# Patient Record
Sex: MALE | Race: WHITE | NOT HISPANIC OR LATINO | Employment: FULL TIME | URBAN - METROPOLITAN AREA
[De-identification: names, ages, dates, MRNs, and addresses within clinical notes are randomized per-mention and may not be internally consistent; named-entity substitution may affect disease eponyms.]

---

## 2020-04-23 ENCOUNTER — OCCUPATIONAL HEALTH (OUTPATIENT)
Dept: URGENT CARE | Facility: CLINIC | Age: 45
End: 2020-04-23

## 2020-04-23 DIAGNOSIS — Z02.89 ENCOUNTER FOR PHYSICAL EXAMINATION RELATED TO EMPLOYMENT: ICD-10-CM

## 2020-04-23 PROCEDURE — 99499 UNLISTED E&M SERVICE: CPT | Mod: S$GLB,,, | Performed by: EMERGENCY MEDICINE

## 2020-04-23 PROCEDURE — 99499 PHYSICAL - BASIC COMPLEXITY: ICD-10-PCS | Mod: S$GLB,,, | Performed by: EMERGENCY MEDICINE

## 2023-04-20 PROBLEM — Z00.00 ANNUAL PHYSICAL EXAM: Status: ACTIVE | Noted: 2023-04-20

## 2023-05-15 PROBLEM — I10 MODERATE HYPERTENSION: Status: ACTIVE | Noted: 2023-05-15

## 2023-05-18 ENCOUNTER — TELEPHONE (OUTPATIENT)
Dept: ENDOCRINOLOGY | Facility: HOSPITAL | Age: 48
End: 2023-05-18
Payer: COMMERCIAL

## 2023-05-22 NOTE — TELEPHONE ENCOUNTER
Please schedule visit with me on June 7th in the afternoon. Please open up my afternoon schedule that day.

## 2023-06-07 ENCOUNTER — OFFICE VISIT (OUTPATIENT)
Dept: ENDOCRINOLOGY | Facility: CLINIC | Age: 48
End: 2023-06-07
Payer: COMMERCIAL

## 2023-06-07 DIAGNOSIS — E66.01 CLASS 2 SEVERE OBESITY DUE TO EXCESS CALORIES WITH SERIOUS COMORBIDITY AND BODY MASS INDEX (BMI) OF 38.0 TO 38.9 IN ADULT: ICD-10-CM

## 2023-06-07 DIAGNOSIS — E88.810 METABOLIC SYNDROME: ICD-10-CM

## 2023-06-07 DIAGNOSIS — R74.8 ELEVATED LIVER ENZYMES: ICD-10-CM

## 2023-06-07 DIAGNOSIS — E11.9 TYPE 2 DIABETES MELLITUS WITHOUT COMPLICATION, WITHOUT LONG-TERM CURRENT USE OF INSULIN: Primary | ICD-10-CM

## 2023-06-07 DIAGNOSIS — E11.65 TYPE 2 DIABETES MELLITUS WITH HYPERGLYCEMIA, WITHOUT LONG-TERM CURRENT USE OF INSULIN: ICD-10-CM

## 2023-06-07 DIAGNOSIS — I10 MODERATE HYPERTENSION: ICD-10-CM

## 2023-06-07 DIAGNOSIS — E78.1 HYPERTRIGLYCERIDEMIA: ICD-10-CM

## 2023-06-07 PROBLEM — Z00.00 ANNUAL PHYSICAL EXAM: Status: RESOLVED | Noted: 2023-04-20 | Resolved: 2023-06-07

## 2023-06-07 PROBLEM — E66.9 CLASS 2 OBESITY WITH BODY MASS INDEX (BMI) OF 38.0 TO 38.9 IN ADULT: Status: ACTIVE | Noted: 2023-06-07

## 2023-06-07 PROCEDURE — 99204 OFFICE O/P NEW MOD 45 MIN: CPT | Mod: 95,,, | Performed by: INTERNAL MEDICINE

## 2023-06-07 PROCEDURE — 3051F PR MOST RECENT HEMOGLOBIN A1C LEVEL 7.0 - < 8.0%: ICD-10-PCS | Mod: CPTII,95,, | Performed by: INTERNAL MEDICINE

## 2023-06-07 PROCEDURE — 3051F HG A1C>EQUAL 7.0%<8.0%: CPT | Mod: CPTII,95,, | Performed by: INTERNAL MEDICINE

## 2023-06-07 PROCEDURE — 99204 PR OFFICE/OUTPT VISIT, NEW, LEVL IV, 45-59 MIN: ICD-10-PCS | Mod: 95,,, | Performed by: INTERNAL MEDICINE

## 2023-06-07 RX ORDER — SEMAGLUTIDE 1.34 MG/ML
1 INJECTION, SOLUTION SUBCUTANEOUS
Qty: 3 ML | Refills: 11 | Status: SHIPPED | OUTPATIENT
Start: 2023-06-07 | End: 2023-06-19 | Stop reason: SDUPTHER

## 2023-06-07 NOTE — ASSESSMENT & PLAN NOTE
In context of diabetes and hypertriglyceridemia, this is very likely due to NAFLD. Weight loss is advised as the best way to prevent progression.

## 2023-06-07 NOTE — ASSESSMENT & PLAN NOTE
Doing great with dietary modification and Ozempic so far. Titrating Ozempic to 1.0 mg to promote additional weight loss.

## 2023-06-07 NOTE — ASSESSMENT & PLAN NOTE
Reviewed goals of therapy are to get the best control we can without hypoglycemia.    Currently meeting glycemic target: yes - planning to recheck A1c in August with PCP visit.    Medication changes:   Stop:   N/a  Start:    N/a  Increase:   Ozempic 0.5 > 1.0 mg weekly - can further titrate to 2.0 mg weekly in a few months if weight loss reaches another plateau      Advised frequent self blood glucose monitoring as he is doing. Discussed glycemic goals pre-meal and post-prandial.    Close adherence to lifestyle changes recommended.      Diabetes health maintenance topics are addressed in the HPI. Planning to schedule a visit with his ophthalmologist. Needs foot exam, but this was a virtual visit. Can be done in primary care. Urine microalbumin/Cr ordered with next labs.    No results found for: HGBA1C

## 2023-06-07 NOTE — ASSESSMENT & PLAN NOTE
On high intensity statin therapy. Dietary modification and weight loss will help lower triglycerides.

## 2023-06-07 NOTE — ASSESSMENT & PLAN NOTE
Reviewed his BP regimen, which includes an ARB. Well-controlled per clinic measurements. Advised to self-monitor at home 3x per week. BP goal <130/80.

## 2023-06-07 NOTE — PROGRESS NOTES
NEW PATIENT - AUDIOVISUAL VIRTUAL VISIT    Subjective:      Chief Complaint: Diabetes    HPI: Laron Carl is a 48 y.o. male who is seen for type 2 diabetes mellitus    Past Medical History:   Diagnosis Date    Essential (primary) hypertension     Hypertriglyceridemia     Type 2 diabetes mellitus without complications          With regards to the type 2 diabetes:    Dx with type 2 diabetes on routine labs from 4/21/2023 showing A1c 7.1% with elevated fasting glucose at 160. Labs also show evidence of metabolic syndrome with elevated ALT and hypertriglyceridemia. He was started on Ozempic per primary care on 5/15/2023. He's definitely noticed appetite suppression on Ozempic 0.5 mg weekly - down ~20 lbs so far. He's taken it in the past and was able to lose weight but ultimately reached a plateau on that dose. He's interested in trying the 1 mg dose. In addition to medication, he's made changes to his diet. His aunt is a nutritionist, so he got some advice from her regarding diet. Now eating low carb. Not exercising as much due to some hip/knee pain, but he plans to start doing that.    Most recent a1c was 7.1 on 04/21/2023    Medical Therapy:  Current diabetic medications include:     Diabetes Medications               semaglutide (OZEMPIC) 0.25 mg or 0.5 mg (2 mg/3 mL) pen injector Inject 0.5 mg into the skin every 7 days.            Blood sugars:  Current monitoring regimen: Fingerstick glucose three times per day before meals    Pre-breakfast 130-140. Post-meal 110 or less.    Weight trend:  Wt Readings from Last 10 Encounters:   05/15/23 (!) 137.4 kg (303 lb)   04/20/23 135.6 kg (299 lb)       Diabetes Management Status    Statin: Taking  ACE/ARB: Taking    Hypertension Medications               amLODIPine (NORVASC) 2.5 MG tablet Take 1 tablet (2.5 mg total) by mouth once daily.    losartan-hydrochlorothiazide 100-25 mg (HYZAAR) 100-25 mg per tablet Take 1 tablet by mouth once daily.       BP Readings from Last  5 Encounters:   05/15/23 126/82   04/20/23 132/84       Hyperlipidemia Medications               atorvastatin (LIPITOR) 40 MG tablet Take 1 tablet (40 mg total) by mouth every evening.          Lab Results   Component Value Date    LDLCALC 53.0 (L) 04/21/2023    HDL 25 (L) 04/21/2023    TRIG 310 (H) 04/21/2023        Screening or Prevention Patient's value Goal Complete/Controlled?   HgA1C Testing and Control   No results found for: HGBA1C   Annually/Less than 8% Yes   Lipid profile : 04/21/2023 Annually Yes   LDL control Lab Results   Component Value Date    LDLCALC 53.0 (L) 04/21/2023    Annually/Less than 100 mg/dl  Yes   Nephropathy screening No results found for: LABMICR  No results found for: PROTEINUA  No results found for: MICALBCREAT Annually No   Blood pressure BP Readings from Last 1 Encounters:   05/15/23 126/82    Less than 140/90 Yes   Dilated retinal exam Most Recent Eye Exam Date: Not Found Annually Yes   Foot exam   Most Recent Foot Exam Date: Not Found Annually Yes        History:  The patient was initially diagnosed with Type 2 diabetes mellitus:  4/2023.  Known diabetic complications: none  Cardiovascular risk factors: diabetes mellitus, dyslipidemia, hypertension, male gender, and obesity (BMI >= 30 kg/m2)    No results found for: HGBA1C  No results found for: CPEPTIDE, GLUTAMICACID       Lifestyle:    Last visit with Diabetes Educator: Last Education Visit: Not Found        Objective:     BP Readings from Last 5 Encounters:   05/15/23 126/82   04/20/23 132/84       Physical exam was not performed and vitals were not obtained due to this being a telemedicine (virtual) visit.    Wt Readings from Last 10 Encounters:   05/15/23 0741 (!) 137.4 kg (303 lb)   04/20/23 1504 135.6 kg (299 lb)       Assessment/Plan:     Type 2 diabetes mellitus with hyperglycemia, without long-term current use of insulin  Reviewed goals of therapy are to get the best control we can without hypoglycemia.    Currently  meeting glycemic target: yes - planning to recheck A1c in August with PCP visit.    Medication changes:   Stop:  N/a  Start:   N/a  Increase:  Ozempic 0.5 > 1.0 mg weekly - can further titrate to 2.0 mg weekly in a few months if weight loss reaches another plateau      Advised frequent self blood glucose monitoring as he is doing. Discussed glycemic goals pre-meal and post-prandial.    Close adherence to lifestyle changes recommended.      Diabetes health maintenance topics are addressed in the HPI. Planning to schedule a visit with his ophthalmologist. Needs foot exam, but this was a virtual visit. Can be done in primary care. Urine microalbumin/Cr ordered with next labs.    No results found for: HGBA1C        Elevated liver enzymes  In context of diabetes and hypertriglyceridemia, this is very likely due to NAFLD. Weight loss is advised as the best way to prevent progression.    Hypertriglyceridemia  On high intensity statin therapy. Dietary modification and weight loss will help lower triglycerides.    Moderate hypertension  Reviewed his BP regimen, which includes an ARB. Well-controlled per clinic measurements. Advised to self-monitor at home 3x per week. BP goal <130/80.    Class 2 obesity with body mass index (BMI) of 38.0 to 38.9 in adult  Doing great with dietary modification and Ozempic so far. Titrating Ozempic to 1.0 mg to promote additional weight loss.    Metabolic syndrome  See individual components of metabolic syndrome above.      The patient location is: office  The chief complaint leading to consultation is: diabetes  Visit type: Virtual visit with synchronous audio and video  Total time spent with patient: 25 mins  Each patient to whom he or she provides medical services by telemedicine is:  (1) informed of the relationship between the physician and patient and the respective role of any other health care provider with respect to management of the patient; and (2) notified that he or she may  decline to receive medical services by telemedicine and may withdraw from such care at any time.       I spent 25 minutes with the patient, over half of the visit was spent on counseling and/or coordinating the care of the patient.    Counseling includes:  Diagnostic results, impressions, recommendations   Prognosis   Risk and benefits of management/treatment options   Instructions for management treatment and or follow-up   Importance of compliance with management   Risk factor reduction   Patient education

## 2023-06-19 ENCOUNTER — PATIENT MESSAGE (OUTPATIENT)
Dept: ENDOCRINOLOGY | Facility: CLINIC | Age: 48
End: 2023-06-19
Payer: COMMERCIAL

## 2023-06-19 DIAGNOSIS — E11.9 TYPE 2 DIABETES MELLITUS WITHOUT COMPLICATION, WITHOUT LONG-TERM CURRENT USE OF INSULIN: ICD-10-CM

## 2023-06-20 DIAGNOSIS — E11.9 TYPE 2 DIABETES MELLITUS WITHOUT COMPLICATION, WITHOUT LONG-TERM CURRENT USE OF INSULIN: ICD-10-CM

## 2023-06-20 RX ORDER — SEMAGLUTIDE 1.34 MG/ML
1 INJECTION, SOLUTION SUBCUTANEOUS
Qty: 3 ML | Refills: 11 | Status: SHIPPED | OUTPATIENT
Start: 2023-06-20 | End: 2023-10-26

## 2023-06-20 RX ORDER — SEMAGLUTIDE 1.34 MG/ML
1 INJECTION, SOLUTION SUBCUTANEOUS
Qty: 3 ML | Refills: 11 | Status: CANCELLED | OUTPATIENT
Start: 2023-06-20 | End: 2024-06-19

## 2023-06-21 NOTE — TELEPHONE ENCOUNTER
Spoke with Ms. Melissa JAMES with [facility] to see what's wrong with his P.A and she stated that patient Ozempic is covered for a lifetime . So informed patient to please contact his pharmacy so they can fill it

## 2023-10-26 PROBLEM — Z79.899 ENCOUNTER FOR LONG-TERM (CURRENT) USE OF MEDICATIONS: Status: ACTIVE | Noted: 2023-10-26

## 2023-10-26 PROBLEM — E66.01 CLASS 2 SEVERE OBESITY DUE TO EXCESS CALORIES WITH SERIOUS COMORBIDITY AND BODY MASS INDEX (BMI) OF 36.0 TO 36.9 IN ADULT: Status: ACTIVE | Noted: 2023-06-07

## 2023-11-30 ENCOUNTER — PATIENT MESSAGE (OUTPATIENT)
Dept: ENDOCRINOLOGY | Facility: CLINIC | Age: 48
End: 2023-11-30
Payer: COMMERCIAL

## 2023-11-30 DIAGNOSIS — E11.9 TYPE 2 DIABETES MELLITUS WITHOUT COMPLICATION, WITHOUT LONG-TERM CURRENT USE OF INSULIN: Primary | ICD-10-CM

## 2024-08-01 DIAGNOSIS — E11.9 TYPE 2 DIABETES MELLITUS WITHOUT COMPLICATION, WITHOUT LONG-TERM CURRENT USE OF INSULIN: ICD-10-CM

## 2024-08-01 RX ORDER — SEMAGLUTIDE 1.34 MG/ML
INJECTION, SOLUTION SUBCUTANEOUS
Qty: 3 ML | Refills: 11 | Status: SHIPPED | OUTPATIENT
Start: 2024-08-01